# Patient Record
(demographics unavailable — no encounter records)

---

## 2024-11-26 NOTE — PHYSICAL EXAM
[Well-appearing] : well-appearing [Normocephalic] : normocephalic [No dysmorphic facial features] : no dysmorphic facial features [No deformities] : no deformities [Alert] : alert [Well related, good eye contact] : well related, good eye contact [Conversant] : conversant [Normal speech and language] : normal speech and language [Follows instructions well] : follows instructions well [Full extraocular movements] : full extraocular movements [No nystagmus] : no nystagmus [Normal facial sensation to light touch] : normal facial sensation to light touch [No facial asymmetry or weakness] : no facial asymmetry or weakness [Gross hearing intact] : gross hearing intact [Equal palate elevation] : equal palate elevation [Good shoulder shrug] : good shoulder shrug [Normal tongue movement] : normal tongue movement [Midline tongue, no fasciculations] : midline tongue, no fasciculations [No abnormal involuntary movements] : no abnormal involuntary movements [Walks and runs well] : walks and runs well [Able to walk on heels] : able to walk on heels [Able to walk on toes] : able to walk on toes [Good walking balance] : good walking balance [Normal gait] : normal gait [Gets up on table without difficulty] : gets up on table without difficulty [de-identified] : No respiratory distress noted.

## 2024-11-26 NOTE — ASSESSMENT
[FreeTextEntry1] : Vielka is a 12-year-old girl previously diagnosed with ADHD by dev peds at age 8. Presents today accompanied by mom for re-evaluation due to academic struggles and social anxiety. She previously took Adderall, started in 2020, but discontinued it due to side effects and decreased appetite. Currently in 8th grade with no accommodations or IEP, her mother is requesting a 504 plan and considering restarting medication. Non focal exam. Plan to repeat workup ADHD w/u using [employer] forms.

## 2024-11-26 NOTE — HISTORY OF PRESENT ILLNESS
[FreeTextEntry1] : TOREY is a 12 year old female here for initial evaluation of ADHD. Dg w/ ADHD x 8yrs old (2020) by dev peds. Started on Adderall.    Early development: TOREY was born full term via NVD. she was discharged home with mother. she hit all early developmental milestones appropriately.    Educational assessment: Current Grade: 8th grade Current District: [address] Currently in a regular classroom setting without accommodations. Is struggling academically, particularly in math and science, especially with exams. TOREY reports difficulty following tasks, inattention, and daydreaming, requiring frequent redirection in class. Although she previously thrived in an ICT setting (grades 3-6) and benefited from Adderall in 3rd grade (improved focus, decreased appetite), she discontinued the medication due to undesirable side effects. Her mother is seeking a 504 plan, did not qualify for an IEP.   Social Concerns: Has friends in school.  + social anxiety. Scared of talking out loud in class. Scared of asking for help in class.  Sleep: sleeps well Eating: eats a varied diet   Co- morbidities: No concern for depression, OCD Other health concerns: Denies staring, twitching, seizure or seizure-like activity.  EMOTIONAL Can get overwhelmed w/ school. Hard to manage class assignments. Can procrastinate.    ACTIVITIES/INTERESTS: Dance competition teams x 4days/week.    FAMILY HISTORY: Family history of ADHD: uncle, mother, brother Family history of anxiety or depression: grandmother.  Denies family history of cardiac conditions or early unexplained deaths.

## 2024-11-26 NOTE — PLAN
[FreeTextEntry1] : - Letter for accommodations provided to parent. - 504 letter placed in SW mailbox. To be completed and then emailed to parent.  - [employer] questionnaires given for parent and teacher -  Discussed use of Omega 3 fish oil - Discussed use of medications as well as side effects if accommodations do not improve school performance.  - Discussed potential benefits of behavioral therapy - Follow up 1 month to review [place] questionnaires

## 2024-12-19 NOTE — PLAN
[FreeTextEntry1] : - Pending [place] questionnaires -  Discussed use of Omega 3 fish oil - Discussed use of medications as well as side effects if accommodations do not improve school performance.  - Discussed potential benefits of behavioral therapy - Follow up once all forms completed to review results.

## 2024-12-19 NOTE — ASSESSMENT
[FreeTextEntry1] : TOREY is a 13-year-old girl previously diagnosed with ADHD by dev peds at age 8. Trialed Adderall in the past (3rd grade) but d/c due to s/e. Presents today accompanied by mom for re-evaluation due to academic struggles and social anxiety. Currently in 8th grade, regular setting and initiated evaluation for IEP. Pending [facility] assessments prior to restart of stimulant. In addition, mother is exploring behavioral therapy/counseling, pending insurance activation. Non focal exam.

## 2024-12-19 NOTE — PHYSICAL EXAM
[Well-appearing] : well-appearing [Normocephalic] : normocephalic [No dysmorphic facial features] : no dysmorphic facial features [No deformities] : no deformities [Alert] : alert [Well related, good eye contact] : well related, good eye contact [Conversant] : conversant [Normal speech and language] : normal speech and language [Follows instructions well] : follows instructions well [Full extraocular movements] : full extraocular movements [No nystagmus] : no nystagmus [Normal facial sensation to light touch] : normal facial sensation to light touch [No facial asymmetry or weakness] : no facial asymmetry or weakness [Gross hearing intact] : gross hearing intact [Equal palate elevation] : equal palate elevation [Good shoulder shrug] : good shoulder shrug [Normal tongue movement] : normal tongue movement [Midline tongue, no fasciculations] : midline tongue, no fasciculations [Gets up on table without difficulty] : gets up on table without difficulty [No abnormal involuntary movements] : no abnormal involuntary movements [Walks and runs well] : walks and runs well [Able to walk on heels] : able to walk on heels [Able to walk on toes] : able to walk on toes [Good walking balance] : good walking balance [Normal gait] : normal gait [de-identified] : No respiratory distress noted.

## 2024-12-19 NOTE — PLAN
[FreeTextEntry1] : - Pending [place] questionnaires -  Discussed use of Omega 3 fish oil - Discussed use of medications as well as side effects if accommodations do not improve school performance.  - Discussed potential benefits of behavioral therapy - Follow up once all forms completed to review results.

## 2024-12-19 NOTE — HISTORY OF PRESENT ILLNESS
[FreeTextEntry1] : TOREY is a 13-year-old female here for f/u evaluation of ADHD. Dg w/ ADHD x 8yrs old (2020) by dev peds. Last seen x 11/2024 for reevaluation due to academic struggles and social anxiety. Currently in 8th grade, regular setting and no accommodations in place.    f/u 12/19/2024 [place] assessment pending. Since last visit, in process of evaluation for IEP. Mother is also exploring behavioral therapy/counseling options and is awaiting insurance activation in January.  Initial Visit: 11/26/2024 Early development: TOREY was born full term via NVD. she was discharged home with mother. she hit all early developmental milestones appropriately.    Educational assessment: Current Grade: 8th grade Current District: [address] Currently in a regular classroom setting without accommodations. Is struggling academically, particularly in math and science, especially with exams. TOREY reports difficulty following tasks, inattention, and daydreaming, requiring frequent redirection in class. Although she previously thrived in an ICT setting (grades 3-6) and benefited from Adderall in 3rd grade (improved focus, decreased appetite), she discontinued the medication due to undesirable side effects. Her mother is seeking a 504 plan, did not qualify for an IEP.   Social Concerns: Has friends in school.  + social anxiety. Scared of talking out loud in class. Scared of asking for help in class.  Sleep: sleeps well Eating: eats a varied diet   Co- morbidities: No concern for depression, OCD Other health concerns: Denies staring, twitching, seizure or seizure-like activity.  EMOTIONAL Can get overwhelmed w/ school. Hard to manage class assignments. Can procrastinate.    ACTIVITIES/INTERESTS: Dance competition teams x 4days/week.    FAMILY HISTORY: Family history of ADHD: uncle, mother, brother Family history of anxiety or depression: grandmother.  Denies family history of cardiac conditions or early unexplained deaths.

## 2024-12-19 NOTE — HISTORY OF PRESENT ILLNESS
[FreeTextEntry1] : TOREY is a 13-year-old female here for f/u evaluation of ADHD. Dg w/ ADHD x 8yrs old (2020) by dev peds. Last seen x 11/2024 for reevaluation due to academic struggles and social anxiety. Currently in 8th grade, regular setting and no accommodations in place.    f/u 12/19/2024 [place] assessment pending. Since last visit, in process of evaluation for IEP. Mother is also exploring behavioral therapy/counseling options and is awaiting insurance activation in January.  Initial Visit: 11/26/2024 Early development: TOREY was born full term via NVD. she was discharged home with mother. she hit all early developmental milestones appropriately.    Educational assessment: Current Grade: 8th grade Current District: [address] Currently in a regular classroom setting without accommodations. Is struggling academically, particularly in math and science, especially with exams. TOREY reports difficulty following tasks, inattention, and daydreaming, requiring frequent redirection in class. Although she previously thrived in an ICT setting (grades 3-6) and benefited from Adderall in 3rd grade (improved focus, decreased appetite), she discontinued the medication due to undesirable side effects. Her mother is seeking a 504 plan, did not qualify for an IEP.   Social Concerns: Has friends in school.  + social anxiety. Scared of talking out loud in class. Scared of asking for help in class.  Sleep: sleeps well Eating: eats a varied diet   Co- morbidities: No concern for depression, OCD Other health concerns: Denies staring, twitching, seizure or seizure-like activity.  EMOTIONAL Can get overwhelmed w/ school. Hard to manage class assignments. Can procrastinate.    ACTIVITIES/INTERESTS: Dance competition teams x 4days/week.    FAMILY HISTORY: Family history of ADHD: uncle, mother, brother Family history of anxiety or depression: grandmother.  Denies family history of cardiac conditions or early unexplained deaths.

## 2024-12-19 NOTE — REASON FOR VISIT
[Initial Consultation] : an initial consultation for [ADHD] : ADHD [Patient] : patient [Mother] : mother [Medical Records] : medical records [Follow-Up Evaluation] : a follow-up evaluation for

## 2024-12-19 NOTE — PHYSICAL EXAM
[Well-appearing] : well-appearing [Normocephalic] : normocephalic [No dysmorphic facial features] : no dysmorphic facial features [No deformities] : no deformities [Alert] : alert [Well related, good eye contact] : well related, good eye contact [Conversant] : conversant [Normal speech and language] : normal speech and language [Follows instructions well] : follows instructions well [Full extraocular movements] : full extraocular movements [No nystagmus] : no nystagmus [Normal facial sensation to light touch] : normal facial sensation to light touch [No facial asymmetry or weakness] : no facial asymmetry or weakness [Gross hearing intact] : gross hearing intact [Equal palate elevation] : equal palate elevation [Good shoulder shrug] : good shoulder shrug [Normal tongue movement] : normal tongue movement [Midline tongue, no fasciculations] : midline tongue, no fasciculations [Gets up on table without difficulty] : gets up on table without difficulty [No abnormal involuntary movements] : no abnormal involuntary movements [Walks and runs well] : walks and runs well [Able to walk on heels] : able to walk on heels [Able to walk on toes] : able to walk on toes [Good walking balance] : good walking balance [Normal gait] : normal gait [de-identified] : No respiratory distress noted.

## 2025-03-20 NOTE — PLAN
[FreeTextEntry1] : - Mother to send copy of current IEP and most recent psychoeducational testing from school -  Start Focalin XR 5mg. S/E discussed.  - Discussed potential benefits of behavioral therapy.  -  Discussed use of Omega 3 fish oil and magnesium supplements.  - rEEG/AEEG  - Follow up 1 month. Call sooner if any concerns.

## 2025-03-20 NOTE — REASON FOR VISIT
[Home] : at home, [unfilled] , at the time of the visit. [Verbal consent obtained from patient] : the patient, [unfilled] [Follow-Up Evaluation] : a follow-up evaluation for [ADHD] : ADHD [Patient] : patient [Medical Records] : medical records [Mother] : mother [This encounter was initiated by telehealth (audio with video) and converted to telephone (audio only)] : This encounter was initiated by telehealth (audio with video) and converted to telephone (audio only)

## 2025-03-20 NOTE — ASSESSMENT
[FreeTextEntry1] : 14yo female with ADHD (diagnosed at age 8) follows up due to persistent inattention and social anxiety, impacting her 8th-grade academic performance. Since her last visit (November 2024), an IEP has been approved. Inattention manifests as daydreaming, distractibility, and requiring frequent redirection both at school and home. Social anxiety contributes to difficulty initiating conversations/making friends, avoidance of help-seeking, and reluctance to participate in class. School counseling has been initiated weekly, and the mother is seeking outside therapy. [place] forms reviewed which reflect these challenges and consistent w/ dg of ADHD, predominately inattentive type.   A trial of Focalin XR 5mg was discussed with mother to address inattention, along with existing school accommodations. Side effects, including appetite suppression, insomnia, and potential worsening of anxiety, were reviewed. Further testing is also recommended to evaluate daydreaming episodes, to differentiate between inattention and the possibility of absence seizures.  MOC verbalized understanding. All questions answered.   Failed meds: Adderall (trialed in 3rd grade)

## 2025-03-20 NOTE — HISTORY OF PRESENT ILLNESS
[FreeTextEntry1] : TOREY is a 13-year-old female here for f/u evaluation of ADHD. Dg w/ ADHD x 8yrs old (2020) by dev peds. Last seen x 11/2024 for reevaluation due to academic struggles and social anxiety. Currently in 8th grade, regular setting and no accommodations in place.   f/u 03/20/2025 [place] forms reviewed:   Parents responses: + Inattention 9/9- (6/9).  Hyperactivity 1/9 (6/9)  ODD: 0/8. (4/8) 19-26  Conduct disorder:0 /14 (3/14) 27-40 + Anxiety/ Depression: 3/7 (3/7) Overall performance: struggle w/ math.    Teachers responses: Darshan [place]  Inattention 0/9- (6/9).  Hyperactivity 0/9 (6/9)  ODD/ Conduct: 0/10. (3/10) 19-28  Anxiety/ Depression:  0/7 (3/7)  Overall Performance: average  Teachers responses: TELMA + Inattention 7/9- (6/9).  Hyperactivity 1/9 (6/9)  ODD/ Conduct: 0/10. (3/10) 19-28  Anxiety/ Depression: 1 /7 (3/7)  Overall Performance: struggles w/ relationship w/ peers.   Teachers responses: [employer] regent + Inattention 6/9- (6/9).  Hyperactivity 1/9 (6/9)  ODD/ Conduct: 0/10. (3/10) 19-28  Anxiety/ Depression: 0 /7 (3/7)  Overall Performance: struggles   Since last visit, IEP approved.  Mother reports continued inattention at school and home, characterized by daydreaming, distractibility (e.g., playing with hair), and requiring frequent redirection to refocus. Her mother reports similar inattentive behaviors at home, noting that she often needs to repeat herself.  Persistent social anxiety remains a concern. TOREY avoids asking for help, struggles with initiating conversations and making friends, and dislikes being the center of attention, preferring not to ask questions or raise her hand in class. Although school counseling is scheduled to begin weekly, her mother is also seeking an outside therapist.  f/u 12/19/2024 [facility] assessment pending. Since last visit, in process of evaluation for IEP. Mother is also exploring behavioral therapy/counseling options and is awaiting insurance activation in January.  Initial Visit: 11/26/2024 Early development: TOREY was born full term via NVD. she was discharged home with mother. she hit all early developmental milestones appropriately.    Educational assessment: Current Grade: 8th grade Current District: [address] Currently in a regular classroom setting without accommodations. Is struggling academically, particularly in math and science, especially with exams. TOREY reports difficulty following tasks, inattention, and daydreaming, requiring frequent redirection in class. Although she previously thrived in an ICT setting (grades 3-6) and benefited from Adderall in 3rd grade (improved focus, decreased appetite), she discontinued the medication due to undesirable side effects. Her mother is seeking a 504 plan, did not qualify for an IEP.   Social Concerns: Has friends in school.  + social anxiety. Scared of talking out loud in class. Scared of asking for help in class.  Sleep: sleeps well Eating: eats a varied diet   Co- morbidities: No concern for depression, OCD Other health concerns: Denies staring, twitching, seizure or seizure-like activity.  EMOTIONAL Can get overwhelmed w/ school. Hard to manage class assignments. Can procrastinate.    ACTIVITIES/INTERESTS: Dance competition teams x 4days/week.    FAMILY HISTORY: Family history of ADHD: uncle, mother, brother Family history of anxiety or depression: grandmother.  Denies family history of cardiac conditions or early unexplained deaths.